# Patient Record
Sex: MALE | Race: OTHER | NOT HISPANIC OR LATINO | Employment: OTHER | URBAN - METROPOLITAN AREA
[De-identification: names, ages, dates, MRNs, and addresses within clinical notes are randomized per-mention and may not be internally consistent; named-entity substitution may affect disease eponyms.]

---
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
## 2023-11-24 NOTE — ASSESSMENT & PLAN NOTE
Fair In summary, Prakash has hypertension that has been well controlled on the current regimen.  Prakash reports BP readings in the 120-125/60-70 mmHg range at home.  I am therefore not going to make any adjustments today.  At the time of follow-up I will perform an examination and BP/medications check.   Good Other

## 2024-04-17 NOTE — ASSESSMENT & PLAN NOTE
In summary, Prakash has hypertension that has been well controlled on the current regimen.  Prakash reports normal BP readings in the 120-125/60-70 mmHg range at home.  His BP today is elevated but he reports being out of his medication for some time now.  I am therefore restarting the amlodipine and not making any adjustments today.  At the time of follow-up I will perform an examination and BP/medications check.

## 2024-04-18 ENCOUNTER — OFFICE VISIT (OUTPATIENT)
Dept: PEDIATRIC CARDIOLOGY | Facility: CLINIC | Age: 19
End: 2024-04-18
Payer: MEDICAID

## 2024-04-18 VITALS
RESPIRATION RATE: 12 BRPM | WEIGHT: 249.31 LBS | OXYGEN SATURATION: 99 % | HEART RATE: 97 BPM | SYSTOLIC BLOOD PRESSURE: 142 MMHG | DIASTOLIC BLOOD PRESSURE: 68 MMHG | BODY MASS INDEX: 41.54 KG/M2 | HEIGHT: 65 IN

## 2024-04-18 DIAGNOSIS — I10 ESSENTIAL HYPERTENSION: Primary | ICD-10-CM

## 2024-04-18 PROCEDURE — 99214 OFFICE O/P EST MOD 30 MIN: CPT | Mod: S$PBB,,, | Performed by: PEDIATRICS

## 2024-04-18 PROCEDURE — 1160F RVW MEDS BY RX/DR IN RCRD: CPT | Mod: CPTII,,, | Performed by: PEDIATRICS

## 2024-04-18 PROCEDURE — 3077F SYST BP >= 140 MM HG: CPT | Mod: CPTII,,, | Performed by: PEDIATRICS

## 2024-04-18 PROCEDURE — 99999 PR PBB SHADOW E&M-EST. PATIENT-LVL III: CPT | Mod: PBBFAC,,, | Performed by: PEDIATRICS

## 2024-04-18 PROCEDURE — 1159F MED LIST DOCD IN RCRD: CPT | Mod: CPTII,,, | Performed by: PEDIATRICS

## 2024-04-18 PROCEDURE — 3008F BODY MASS INDEX DOCD: CPT | Mod: CPTII,,, | Performed by: PEDIATRICS

## 2024-04-18 PROCEDURE — 99213 OFFICE O/P EST LOW 20 MIN: CPT | Mod: PBBFAC | Performed by: PEDIATRICS

## 2024-04-18 PROCEDURE — 3078F DIAST BP <80 MM HG: CPT | Mod: CPTII,,, | Performed by: PEDIATRICS

## 2024-04-18 RX ORDER — AMLODIPINE BESYLATE 10 MG/1
10 TABLET ORAL DAILY
Qty: 90 TABLET | Refills: 3 | Status: SHIPPED | OUTPATIENT
Start: 2024-04-18 | End: 2025-04-18

## 2024-04-18 NOTE — PROGRESS NOTES
Thank you for referring your patient Prakash Gold to the Pediatric Cardiology clinic for consultation. Please review my findings below and feel free to contact for me for any questions or concerns.    Prakash Gold is a 18 y.o. male seen in clinic today for Hypertension     ASSESSMENT/PLAN:  1. Essential hypertension  Overview:  05/20/21 BP:  131/81 mmHg    Assessment & Plan:  In summary, Prakash has hypertension that has been well controlled on the current regimen.  Prakash reports normal BP readings in the 120-125/60-70 mmHg range at home.  His BP today is elevated but he reports being out of his medication for some time now.  I am therefore restarting the amlodipine and not making any adjustments today.  At the time of follow-up I will perform an examination and BP/medications check.    Orders:  -     amLODIPine (NORVASC) 10 MG tablet; Take 1 tablet (10 mg total) by mouth once daily.  Dispense: 90 tablet; Refill: 3      Preventive Medicine:  SBE prophylaxis - None indicated  Exercise - No activity restrictions    Follow Up:  Follow up in about 3 months (around 7/18/2024) for Recheck with BP.      SUBJECTIVE:  HPI  Prakash is a 18 y.o. whom I follow for mild hypertension. The patient was last seen 1 year and 5 months ago and returns today for a late follow up. The patient is currently maintained on Amlodipine 10 mg QD and reports medication compliance with the last dose taken 1 month ago due to running out of refills.    The patient monitors blood pressure at home and reports an average reading of 125/70 mmHg. There are no complaints of headaches, lightheadedness, dizziness, chest pain, shortness of breath, tachycardia, palpitations, activity intolerance, or syncope.      Review of patient's allergies indicates:  No Known Allergies    Current Outpatient Medications:     metFORMIN (GLUCOPHAGE) 1000 MG tablet, Take by mouth., Disp: , Rfl:     amLODIPine (NORVASC) 10 MG tablet, Take 1 tablet (10 mg total) by mouth  "once daily., Disp: 90 tablet, Rfl: 3  Past Medical History:   Diagnosis Date    Diabetes     Hypertension     Obesity, unspecified       No past surgical history on file.  Family History   Problem Relation Name Age of Onset    Hypertension Mother      Diabetes Mother      Diabetes Father      Mitral valve prolapse Maternal Grandmother      Congenital heart disease Maternal Grandmother      Stroke Maternal Grandmother      Heart attack Maternal Grandfather      Cancer Maternal Grandfather      Hypertension Maternal Grandfather      Hyperlipidemia Maternal Grandfather      Diabetes Maternal Grandfather      Hypertension Paternal Grandmother      Diabetes Paternal Grandmother        There is no direct family history of sudden death, arrythmia, myocardial infarction, or other inheritable disorders.  Social History     Socioeconomic History    Marital status: Single   Tobacco Use    Smoking status: Unknown   Social History Narrative    Smokers in the household: No    Active: Yes    Caffeine: Yes, coffee occasionally    Drugs: No    Alcohol: No       Interval Hospitalizations/Procedures:  none    Review of Systems   A comprehensive review of symptoms was completed and negative except as noted above.    OBJECTIVE:  Vital signs  Vitals:    04/18/24 1052 04/18/24 1053   BP: (!) 140/78 (!) 142/68   BP Location: Right arm Right arm   Patient Position: Lying Lying   BP Method: Large (Manual) Large (Automatic)   Pulse: 97    Resp: 12    SpO2: 99%    Weight: 113.1 kg (249 lb 5.4 oz)    Height: 5' 5.32" (1.659 m)       Body mass index is 41.09 kg/m².    Physical Exam  Vitals reviewed.   Constitutional:       General: He is not in acute distress.     Appearance: Normal appearance. He is normal weight. He is not toxic-appearing or diaphoretic.   HENT:      Head: Normocephalic and atraumatic.      Mouth/Throat:      Mouth: Mucous membranes are moist.   Cardiovascular:      Rate and Rhythm: Normal rate and regular rhythm.      Pulses: " Normal pulses.           Radial pulses are 2+ on the right side.        Femoral pulses are 2+ on the right side.     Heart sounds: Normal heart sounds, S1 normal and S2 normal. No murmur heard.     No friction rub. No gallop.   Pulmonary:      Effort: Pulmonary effort is normal.      Breath sounds: Normal breath sounds.   Skin:     General: Skin is warm and dry.      Capillary Refill: Capillary refill takes less than 2 seconds.   Neurological:      General: No focal deficit present.      Mental Status: He is alert.   Psychiatric:         Mood and Affect: Mood normal.        Electrocardiogram:  Normal sinus rhythm with normal cardiac intervals and normal atrial and ventricular forces    Echocardiogram:  not performed today        Gil Farah MD  [facility]  [id number] [place], [place] [address] [address]  Dept: [phone]  Dept Fax: [phone]

## 2024-10-03 ENCOUNTER — OFFICE VISIT (OUTPATIENT)
Dept: PEDIATRIC CARDIOLOGY | Facility: CLINIC | Age: 19
End: 2024-10-03
Payer: MEDICAID

## 2024-10-03 VITALS
SYSTOLIC BLOOD PRESSURE: 128 MMHG | DIASTOLIC BLOOD PRESSURE: 66 MMHG | WEIGHT: 248.69 LBS | RESPIRATION RATE: 22 BRPM | HEART RATE: 120 BPM | BODY MASS INDEX: 39.97 KG/M2 | HEIGHT: 66 IN

## 2024-10-03 DIAGNOSIS — I10 ESSENTIAL HYPERTENSION: Primary | ICD-10-CM

## 2024-10-03 PROCEDURE — 3008F BODY MASS INDEX DOCD: CPT | Mod: CPTII,,, | Performed by: PEDIATRICS

## 2024-10-03 PROCEDURE — 3078F DIAST BP <80 MM HG: CPT | Mod: CPTII,,, | Performed by: PEDIATRICS

## 2024-10-03 PROCEDURE — 1160F RVW MEDS BY RX/DR IN RCRD: CPT | Mod: CPTII,,, | Performed by: PEDIATRICS

## 2024-10-03 PROCEDURE — 99999 PR PBB SHADOW E&M-EST. PATIENT-LVL III: CPT | Mod: PBBFAC,,, | Performed by: PEDIATRICS

## 2024-10-03 PROCEDURE — 99213 OFFICE O/P EST LOW 20 MIN: CPT | Mod: S$PBB,,, | Performed by: PEDIATRICS

## 2024-10-03 PROCEDURE — 99213 OFFICE O/P EST LOW 20 MIN: CPT | Mod: PBBFAC | Performed by: PEDIATRICS

## 2024-10-03 PROCEDURE — 1159F MED LIST DOCD IN RCRD: CPT | Mod: CPTII,,, | Performed by: PEDIATRICS

## 2024-10-03 PROCEDURE — 3074F SYST BP LT 130 MM HG: CPT | Mod: CPTII,,, | Performed by: PEDIATRICS

## 2024-10-03 RX ORDER — AMLODIPINE BESYLATE 10 MG/1
10 TABLET ORAL DAILY
Qty: 90 TABLET | Refills: 3 | Status: SHIPPED | OUTPATIENT
Start: 2024-10-03 | End: 2025-10-03

## 2024-10-03 NOTE — PROGRESS NOTES
Thank you for referring your patient Prakash Gold to the Pediatric Cardiology clinic for consultation. Please review my findings below and feel free to contact for me for any questions or concerns.    Prakash Gold is a 19 y.o. male seen in clinic today for hypertension.  .   ASSESSMENT/PLAN:  1. Essential hypertension  Overview:  05/20/21 BP:  131/81 mmHg    Assessment & Plan:  In summary, Prakash has hypertension that has been well controlled on the current regimen.  Prakash reports normal BP readings in the 120-125/60-70 mmHg range at home.  His BP today is normal in the office today.  I am therefore not going to make any changes in the medication regimen today.  At the time of follow-up, I will perform an examination, BP/medication check, and EKG.    Orders:  -     amLODIPine (NORVASC) 10 MG tablet; Take 1 tablet (10 mg total) by mouth once daily.  Dispense: 90 tablet; Refill: 3      Preventive Medicine:  SBE prophylaxis - None indicated  Exercise - No activity restrictions    Follow Up:  Follow up in about 1 year (around 10/3/2025) for Recheck with EKG.      SUBJECTIVE:  HPI  Prakash Gold is a 18 y.o.  whom I follow with hypertension. The patient was last seen 5 months ago and returns today for late follow-up. The patient is currently maintained on Amlodipine 10 mg QD and reports medication compliance with the most recent dose taken at 06:00 this morning. The patient (does/does not) monitor blood pressure at home and reports an average reading of 128/60 mmHg. There are no complaints of headaches, lightheadedness, dizziness, chest pain, shortness of breath, tachycardia, palpitations, activity intolerance, or syncope     Review of patient's allergies indicates:  No Known Allergies    Current Outpatient Medications:     metFORMIN (GLUCOPHAGE) 1000 MG tablet, Take by mouth., Disp: , Rfl:     amLODIPine (NORVASC) 10 MG tablet, Take 1 tablet (10 mg total) by mouth once daily., Disp: 90 tablet, Rfl: 3    Past  "Medical History:   Diagnosis Date    Diabetes     Obesity, unspecified       History reviewed. No pertinent surgical history.    Family History   Problem Relation Name Age of Onset    Hypertension Mother      Diabetes Mother      Diabetes Father      Mitral valve prolapse Maternal Grandmother      Congenital heart disease Maternal Grandmother      Stroke Maternal Grandmother      Heart attack Maternal Grandfather      Cancer Maternal Grandfather      Hypertension Maternal Grandfather      Hyperlipidemia Maternal Grandfather      Diabetes Maternal Grandfather      Hypertension Paternal Grandmother      Diabetes Paternal Grandmother      There is no direct family history of congenital heart disease, sudden death, arrythmia, stroke, or other inheritable disorders.    Social History     Socioeconomic History    Marital status: Single   Tobacco Use    Smoking status: Unknown   Social History Narrative    The patient lives with his mother and 1 brother, and there are no smokers living in the household.  He is physically active and has occasional caffeine intake.       Interval Hospitalizations/Procedures:  none    Review of Systems   A comprehensive review of symptoms was completed and negative except as noted above.    OBJECTIVE:  Vital signs  Vitals:    10/03/24 0854 10/03/24 0900   BP: (!) 149/83 128/66   BP Location: Right arm Right arm   Patient Position: Lying Lying   Pulse: (!) 120    Resp: (!) 22    Weight: 112.8 kg (248 lb 10.9 oz)    Height: 5' 5.75" (1.67 m)       Body mass index is 40.45 kg/m².    Physical Exam  Vitals reviewed.   Constitutional:       General: He is not in acute distress.     Appearance: Normal appearance. He is obese. He is not ill-appearing, toxic-appearing or diaphoretic.   HENT:      Head: Normocephalic and atraumatic.      Mouth/Throat:      Mouth: Mucous membranes are moist.   Cardiovascular:      Rate and Rhythm: Normal rate and regular rhythm.      Pulses: Normal pulses.           " Radial pulses are 2+ on the right side.        Femoral pulses are 2+ on the right side.     Heart sounds: Normal heart sounds, S1 normal and S2 normal. No murmur heard.     No friction rub. No gallop.   Pulmonary:      Effort: Pulmonary effort is normal.      Breath sounds: Normal breath sounds.   Skin:     General: Skin is warm and dry.      Capillary Refill: Capillary refill takes less than 2 seconds.   Neurological:      General: No focal deficit present.      Mental Status: He is alert.   Psychiatric:         Mood and Affect: Mood normal.        Electrocardiogram:  not performed today    Echocardiogram:  not performed today        Gil Farah MD  [facility]  [id number] [place] [id number]  Dept: [phone]  Dept Fax: [phone]

## 2024-10-03 NOTE — ASSESSMENT & PLAN NOTE
In summary, Prakash has hypertension that has been well controlled on the current regimen.  Prakash reports normal BP readings in the 120-125/60-70 mmHg range at home.  His BP today is normal in the office today.  I am therefore not going to make any changes in the medication regimen today.  At the time of follow-up, I will perform an examination, BP/medication check, and EKG.